# Patient Record
Sex: MALE
[De-identification: names, ages, dates, MRNs, and addresses within clinical notes are randomized per-mention and may not be internally consistent; named-entity substitution may affect disease eponyms.]

---

## 2019-11-05 ENCOUNTER — HOSPITAL ENCOUNTER (EMERGENCY)
Dept: HOSPITAL 97 - ER | Age: 49
LOS: 1 days | Discharge: HOME | End: 2019-11-06
Payer: SELF-PAY

## 2019-11-05 DIAGNOSIS — I10: Primary | ICD-10-CM

## 2019-11-05 LAB
ALBUMIN SERPL BCP-MCNC: 3.9 G/DL (ref 3.4–5)
ALP SERPL-CCNC: 61 U/L (ref 45–117)
ALT SERPL W P-5'-P-CCNC: 42 U/L (ref 12–78)
AST SERPL W P-5'-P-CCNC: 22 U/L (ref 15–37)
BUN BLD-MCNC: 13 MG/DL (ref 7–18)
GLUCOSE SERPLBLD-MCNC: 104 MG/DL (ref 74–106)
HCT VFR BLD CALC: 45.2 % (ref 39.6–49)
INR BLD: 1.1
LIPASE SERPL-CCNC: 68 U/L (ref 73–393)
LYMPHOCYTES # SPEC AUTO: 2.3 K/UL (ref 0.7–4.9)
MAGNESIUM SERPL-MCNC: 1.9 MG/DL (ref 1.8–2.4)
PMV BLD: 10.1 FL (ref 7.6–11.3)
POTASSIUM SERPL-SCNC: 3.7 MMOL/L (ref 3.5–5.1)
RBC # BLD: 5.99 M/UL (ref 4.33–5.43)
TROPONIN (EMERG DEPT USE ONLY): < 0.02 NG/ML (ref 0–0.04)

## 2019-11-05 PROCEDURE — 80076 HEPATIC FUNCTION PANEL: CPT

## 2019-11-05 PROCEDURE — 85025 COMPLETE CBC W/AUTO DIFF WBC: CPT

## 2019-11-05 PROCEDURE — 70450 CT HEAD/BRAIN W/O DYE: CPT

## 2019-11-05 PROCEDURE — 80048 BASIC METABOLIC PNL TOTAL CA: CPT

## 2019-11-05 PROCEDURE — 83735 ASSAY OF MAGNESIUM: CPT

## 2019-11-05 PROCEDURE — 71045 X-RAY EXAM CHEST 1 VIEW: CPT

## 2019-11-05 PROCEDURE — 96360 HYDRATION IV INFUSION INIT: CPT

## 2019-11-05 PROCEDURE — 93005 ELECTROCARDIOGRAM TRACING: CPT

## 2019-11-05 PROCEDURE — 83690 ASSAY OF LIPASE: CPT

## 2019-11-05 PROCEDURE — 84484 ASSAY OF TROPONIN QUANT: CPT

## 2019-11-05 PROCEDURE — 99285 EMERGENCY DEPT VISIT HI MDM: CPT

## 2019-11-05 PROCEDURE — 36415 COLL VENOUS BLD VENIPUNCTURE: CPT

## 2019-11-05 PROCEDURE — 85730 THROMBOPLASTIN TIME PARTIAL: CPT

## 2019-11-05 PROCEDURE — 85610 PROTHROMBIN TIME: CPT

## 2019-11-05 NOTE — EDPHYS
Physician Documentation                                                                           

 [facility]                                                                 

Name: Javad Gonzalez                                                                               

Age: 49 yrs                                                                                       

Sex: Male                                                                                         

: 1970                                                                                   

MRN: [id number]                                                                                   

Arrival Date: 2019                                                                          

Time: 20:39                                                                                       

Account#: [id number]                                                                            

Bed 8                                                                                             

Private MD:                                                                                       

ED Physician Lamont Camacho                                                                         

HPI:                                                                                              

                                                                                             

21:10 This 49 yrs old  Male presents to ER via Wheelchair with complaints of          rn  

      dizziness, chest pain.                                                                      

21:10 The patient presents with dizziness, generalized weakness, lightheadedness. Onset: The  rn  

      symptoms/episode began/occurred last night. Context: occurred at home, occurred while       

      the patient was standing. Modifying factors: The symptoms are alleviated by lying down,     

      the symptoms are aggravated by standing up, changing position. Associated signs and         

      symptoms: Pertinent positives: chest pain, Pertinent negatives: abdominal pain,             

      confusion, diaphoresis, headache, numbness, seizure, shortness of breath, syncope,          

      tingling, vomiting. Severity of symptoms: At their worst the symptoms were moderate in      

      the emergency department the symptoms have improved. The patient has not experienced        

      similar symptoms in the past. Reports since last night has experienced several episodes     

      of dizziness and feeling like is going to pass out, no LOC, no seizure, reports used to     

      get similar episodes in past with hypoglycemia and pancreas problems. Reports this          

      feels different, tried to drink something or eat something just in case and did not         

      help. Went to EMS station, told had high BP, and to take aspirin, felt better so went       

      home. REports happened again for 4th episode, this time felt some chest tightness,          

      brief, no w resolved. .                                                                     

                                                                                                  

Historical:                                                                                       

- Allergies:                                                                                      

20:42 No Known Allergies;                                                                     lp1 

- Home Meds:                                                                                      

20:42 None [Active];                                                                          lp1 

- PMHx:                                                                                           

20:42 None;                                                                                   lp1 

- PSHx:                                                                                           

20:42 Appendectomy;                                                                           lp1 

                                                                                                  

- Immunization history:: Adult Immunizations up to date.                                          

- Social history:: Smoking status: Patient/guardian denies using tobacco.                         

- Ebola Screening: : No symptoms or risks identified at this time.                                

- Family history:: not pertinent.                                                                 

- Hospitalizations: : No recent hospitalization is reported.                                      

                                                                                                  

                                                                                                  

ROS:                                                                                              

21:18 Constitutional: Negative for fever, chills, and weight loss, Eyes: Negative for injury, rn  

      pain, redness, and discharge, Neck: Negative for injury, pain, and swelling,                

      Cardiovascular: Negative for palpitations, and edema, Respiratory: Negative for             

      shortness of breath, cough, wheezing, and pleuritic chest pain, Abdomen/GI: Negative        

      for abdominal pain, nausea, vomiting, diarrhea, and constipation, MS/Extremity:             

      Negative for injury and deformity, Skin: Negative for injury, rash, and discoloration,      

      Neuro: Negative for headache, weakness, numbness, tingling, and seizure.                    

                                                                                                  

Exam:                                                                                             

21:18 Constitutional:  Overweight male, no acute distress. Head/Face:  Normocephalic,         rn  

      atraumatic. Eyes:  Pupils equal round and reactive to light, extra-ocular motions           

      intact.  Lids and lashes normal.  Conjunctiva and sclera are non-icteric and not            

      injected.  Cornea within normal limits.  Periorbital areas with no swelling, redness,       

      or edema. ENT:  dry MM Neck:  Trachea midline, no thyromegaly or masses palpated, and       

      no cervical lymphadenopathy.  Supple, full range of motion without nuchal rigidity, or      

      vertebral point tenderness.  No Meningismus. Cardiovascular:  Regular rate and rhythm,      

      no murmur.  No pulse deficits. Respiratory:  No increased work of breathing, no             

      retractions or nasal flaring. Abdomen/GI:  soft, non-tender MS/ Extremity:  Pulses          

      equal, no cyanosis.  Neurovascular intact.  Full, normal range of motion.  Equal            

      circumference. Neuro:  Awake and alert, GCS 15, oriented to person, place, time, and        

      situation.  Cranial nerves II-XII grossly intact.  Motor strength 5/5 in all                

      extremities.  Sensory grossly intact.  Cerebellar exam normal.                              

                                                                                                  

Vital Signs:                                                                                      

20:42  / 90; Pulse 87; Resp 20; Temp 97.5(O); Pulse Ox 97% on R/A; Weight 106.14 kg     lp1 

      (R); Pain 8/10;                                                                             

21:51  / 71 Supine; Pulse 77; Resp 20; Pulse Ox 95% on R/A;                             mw2 

21:55  / 82 Sitting; Pulse 84; Resp 19; Pulse Ox 96% on R/A;                            mw2 

22:00  / 81 Standing; Pulse 80; Resp 19; Pulse Ox 99% on R/A;                           mw2 

23:00  / 73; Pulse 80; Resp 15; Pulse Ox 97% on R/A;                                    jb4 

                                                                                             

00:00  / 77; Pulse 74; Resp 12; Pulse Ox 97% on R/A;                                    jb4 

                                                                                                  

MDM:                                                                                              

                                                                                             

20:44 Patient medically screened.                                                             rn  

23:50 Differential diagnosis: cardiac arrhythmia, hypovolemia, idiopathic dizziness,          rn  

      near-syncope, TIA, vertigo. Data reviewed: vital signs, nurses notes, lab test              

      result(s), EKG, radiologic studies, CT scan, plain films, and as a result, I will           

      discharge patient. Counseling: I had a detailed discussion with the patient and/or          

      guardian regarding: the historical points, exam findings, and any diagnostic results        

      supporting the discharge/admit diagnosis, the presence of at least one elevated blood       

      pressure reading (>120/80) during this emergency department visit, lab results,             

      radiology results, the need for outpatient follow up, to return to the emergency            

      department if symptoms worsen or persist or if there are any questions or concerns that     

      arise at home. Response to treatment: the patient's symptoms have resolved after            

      treatment, the patient's condition has returned to base line, the patient is now            

      symptom free, patient is well hydrated. and as a result, I will discharge patient.          

      Special discussion: I discussed with the patient/guardian in detail that at this point      

      there is no indication for admission to the hospital. It is understood, however, that       

      if the symptoms persist or worsen the patient needs to return immediately for               

      re-evaluation. Based on the history and exam findings, there is no indication for           

      further emergent testing or inpatient evaluation. I discussed with the patient/guardian     

      the need to see the cardiologist for further evaluation of the symptoms. I discussed        

      with the patient/guardian the need to see the primary care provider for further             

      evaluation of the symptoms. ED course: Trop neg x 2, no ischemia on ECG, CT head            

      negative for acute findings. Asymptomatic now and BP came down accordingly. Recommend       

      pcp f/u and cardiology f/u, along with daily BP measurement to see if needs medication      

      for BP. Currently down to 101/73 and asymptomatic so will not prescribe HTN med given       

      risk of hypotension and exacerbating near syncopal episodes that patient felt today. .      

                                                                                                  

                                                                                             

20:53 Order name: Basic Metabolic Panel; Complete Time: 22:17                                 rn  

                                                                                             

20:53 Order name: CBC with Diff; Complete Time: 22:17                                         rn  

                                                                                             

20:53 Order name: Hepatic Function; Complete Time: 22:17                                      rn  

                                                                                             

20:53 Order name: Lipase; Complete Time: 22:17                                                rn  

                                                                                             

20:53 Order name: Magnesium; Complete Time: 22:17                                             rn  

                                                                                             

20:53 Order name: Protime (+inr); Complete Time: 22:17                                        rn  

                                                                                             

20:53 Order name: CT Head Brain wo Cont                                                       rn  

                                                                                             

20:53 Order name: Ptt, Activated; Complete Time: 22:17                                        rn  

                                                                                             

20:53 Order name: Troponin (emerg Dept Use Only); Complete Time: 22:17                        rn  

                                                                                             

20:53 Order name: EKG; Complete Time: 20:54                                                   rn  

                                                                                             

20:53 Order name: Cardiac monitoring; Complete Time: 20:58                                    rn  

                                                                                             

20:53 Order name: XRAY Chest (1 view)                                                         rn  

                                                                                             

22:48 Order name: Troponin (emerg Dept Use Only); Complete Time: 23:48                        rn  

                                                                                             

20:53 Order name: EKG - Nurse/Tech; Complete Time: 20:58                                      rn  

                                                                                             

20:53 Order name: IV Saline Lock; Complete Time: 20:58                                        rn  

                                                                                             

20:53 Order name: Labs collected and sent; Complete Time: 20:58                               rn  

                                                                                             

20:53 Order name: NPO; Complete Time: 20:58                                                   rn  

                                                                                             

20:53 Order name: O2 Per Protocol; Complete Time: 20:58                                       rn  

                                                                                             

20:53 Order name: O2 Sat Monitoring; Complete Time: 20:58                                     rn  

                                                                                             

20:54 Order name: Orthostatic Blood Pressure; Complete Time: 21:20                            rn  

                                                                                                  

Administered Medications:                                                                         

20:59 Drug: NS 0.9% 1000 ml Route: IV; Rate: 1000 ml; Site: right antecubital;                aa1 

22:00 Follow up: Response: No adverse reaction; IV Status: Completed infusion; IV Intake:     jb4 

      1000ml                                                                                      

                                                                                                  

                                                                                                  

Disposition:                                                                                      

19 23:52 Discharged to Home. Impression: Hypertension, Dizziness and giddiness.             

- Condition is Stable.                                                                            

- Discharge Instructions: Dizziness, Hypertension, Managing Your Hypertension.                    

                                                                                                  

- Medication Reconciliation Form, Thank You Letter, Antibiotic Education, Prescription            

  Opioid Use form.                                                                                

- Follow up: Private Physician; When: As needed; Reason: Recheck today's complaints,              

  Re-evaluation by your physician.                                                                

- Problem is new.                                                                                 

- Symptoms have improved.                                                                         

                                                                                                  

                                                                                                  

                                                                                                  

Signatures:                                                                                       

Dispatcher [employer]                           EDMS                                                 

Kandis Thompson, RN                  RN   aa1                                                  

Lamont Camacho MD MD rn Pena, Laura, RN                         RN   lp1                                                  

Chris Dumont RN                       RN   jb4                                                  

                                                                                                  

Corrections: (The following items were deleted from the chart)                                    

21:13 21:10 Reports since last night has experienced several episodes of dizziness and        rn  

      feeling like is going to pass out, no LOC, no seizure, reports used to get similar          

      episodes in past with hypoglycemia and pancreas problems. . rn                              

                                                                                             

00:12  23:52 2019 23:52 Discharged to Home. Impression: Hypertension; Dizziness    jb4 

      and giddiness. Condition is Stable. Forms are Medication Reconciliation Form, Thank You     

      Letter, Antibiotic Education, Prescription Opioid Use. Follow up: Private Physician;        

      When: As needed; Reason: Recheck today's complaints, Re-evaluation by your physician.       

      Problem is new. Symptoms have improved. rn                                                  

                                                                                                  

**************************************************************************************************

## 2019-11-05 NOTE — ER
Nurse's Notes                                                                                     

 [facility]                                                                 

Name: Javad Gonzalez                                                                               

Age: 49 yrs                                                                                       

Sex: Male                                                                                         

: 1970                                                                                   

MRN: [id number]                                                                                   

Arrival Date: 2019                                                                          

Time: 20:39                                                                                       

Account#: [id number]                                                                            

Bed 8                                                                                             

Private MD:                                                                                       

Diagnosis: Hypertension;Dizziness and giddiness                                                   

                                                                                                  

Presentation:                                                                                     

                                                                                             

20:41 Presenting complaint: Patient states: High blood pressure since last night, shortness   lp1 

      of breath, chest pain, worse tonight. Transition of care: patient was not received from     

      another setting of care. Onset of symptoms was 2019. Risk Assessment: Do       

      you want to hurt yourself or someone else? Patient reports no desire to harm self or        

      others. Initial Sepsis Screen: Does the patient meet any 2 criteria? No. Patient's          

      initial sepsis screen is negative. Does the patient have a suspected source of              

      infection? No. Patient's initial sepsis screen is negative. Care prior to arrival: None.    

20:41 Method Of Arrival: Wheelchair                                                           lp1 

20:41 Acuity: BABAR 3                                                                           lp1 

                                                                                                  

Historical:                                                                                       

- Allergies:                                                                                      

20:42 No Known Allergies;                                                                     lp1 

- Home Meds:                                                                                      

20:42 None [Active];                                                                          lp1 

- PMHx:                                                                                           

20:42 None;                                                                                   lp1 

- PSHx:                                                                                           

20:42 Appendectomy;                                                                           lp1 

                                                                                                  

- Immunization history:: Adult Immunizations up to date.                                          

- Social history:: Smoking status: Patient/guardian denies using tobacco.                         

- Ebola Screening: : No symptoms or risks identified at this time.                                

- Family history:: not pertinent.                                                                 

- Hospitalizations: : No recent hospitalization is reported.                                      

                                                                                                  

                                                                                                  

Screenin:50 Abuse screen: Denies threats or abuse. Nutritional screening: No deficits noted.        jb4 

      Tuberculosis screening: No symptoms or risk factors identified. Fall Risk None              

      identified.                                                                                 

                                                                                                  

Assessment:                                                                                       

20:50 General: Appears in no apparent distress. uncomfortable, Behavior is calm, cooperative, jb4 

      appropriate for age. Pain: Complains of pain in anterior aspect of left upper chest         

      Pain does not radiate. Pain currently is 4 out of 10 on a pain scale. Quality of pain       

      is described as pressure. Neuro: Level of Consciousness is awake, alert, obeys              

      commands, Oriented to person, place, time, situation. Cardiovascular: Patient's skin is     

      warm and dry. Rhythm is sinus rhythm. Respiratory: Airway is patent Respiratory effort      

      is even, unlabored, Respiratory pattern is regular, symmetrical. GI: No deficits noted.     

      No signs and/or symptoms were reported involving the gastrointestinal system. : No        

      deficits noted. No signs and/or symptoms were reported regarding the genitourinary          

      system. EENT: No deficits noted. No signs and/or symptoms were reported regarding the       

      EENT system. Derm: Skin is intact, Skin is pink, warm \T\ dry. Musculoskeletal:             

      Circulation, motion, and sensation intact. Range of motion: intact in all extremities.      

22:03 Reassessment: Patient appears in no apparent distress at this time. Patient and/or      jb4 

      family updated on plan of care and expected duration. Pain level reassessed. Patient is     

      alert, oriented x 3, equal unlabored respirations, skin warm/dry/pink.                      

23:00 Reassessment: Patient appears in no apparent distress at this time. Patient and/or      jb4 

      family updated on plan of care and expected duration. Pain level reassessed. Patient is     

      alert, oriented x 3, equal unlabored respirations, skin warm/dry/pink. Family is at the     

      bedside.                                                                                    

                                                                                             

00:09 Reassessment: Patient appears in no apparent distress at this time. Patient and/or      jb4 

      family updated on plan of care and expected duration. Pain level reassessed. Patient is     

      alert, oriented x 3, equal unlabored respirations, skin warm/dry/pink.                      

                                                                                                  

Vital Signs:                                                                                      

                                                                                             

20:42  / 90; Pulse 87; Resp 20; Temp 97.5(O); Pulse Ox 97% on R/A; Weight 106.14 kg     lp1 

      (R); Pain 8/10;                                                                             

21:51  / 71 Supine; Pulse 77; Resp 20; Pulse Ox 95% on R/A;                             mw2 

21:55  / 82 Sitting; Pulse 84; Resp 19; Pulse Ox 96% on R/A;                            mw2 

22:00  / 81 Standing; Pulse 80; Resp 19; Pulse Ox 99% on R/A;                           mw2 

23:00  / 73; Pulse 80; Resp 15; Pulse Ox 97% on R/A;                                    jb4 

                                                                                             

00:00  / 77; Pulse 74; Resp 12; Pulse Ox 97% on R/A;                                    jb4 

                                                                                                  

ED Course:                                                                                        

                                                                                             

20:39 Patient arrived in ED.                                                                  jb4 

20:42 Triage completed.                                                                       lp1 

20:42 Arm band placed on.                                                                     lp1 

20:42 Patient has correct armband on for positive identification. Bed in low position.        lp1 

      Cardiac monitor on. Pulse ox on. NIBP on.                                                   

20:44 Lamont Camacho MD is Attending Physician.                                                rn  

20:50 Inserted saline lock: 20 gauge in right antecubital area, using aseptic technique.      jb4 

      Blood collected.                                                                            

21:00 Chris Dumont RN is Primary Nurse.                                                     jb4 

21:16 CT completed. Patient tolerated procedure well. Patient moved back from CT.             nj  

21:25 CT Head Brain wo Cont In Process Unspecified.                                           EDMS

21:32 XRAY Chest (1 view) In Process Unspecified.                                             EDMS

22:03 Warm blanket given.                                                                     jb4 

                                                                                             

00:00 No provider procedures requiring assistance completed. IV discontinued, intact,         jb4 

      bleeding controlled, No redness/swelling at site. Pressure dressing applied.                

                                                                                                  

Administered Medications:                                                                         

                                                                                             

20:59 Drug: NS 0.9% 1000 ml Route: IV; Rate: 1000 ml; Site: right antecubital;                aa1 

22:00 Follow up: Response: No adverse reaction; IV Status: Completed infusion; IV Intake:     jb4 

      1000ml                                                                                      

                                                                                                  

                                                                                                  

Intake:                                                                                           

22:00 IV: 1000ml; Total: 1000ml.                                                              jb4 

                                                                                                  

Outcome:                                                                                          

23:52 Discharge ordered by MD.                                                                rn  

                                                                                             

00:10 Discharged to home ambulatory, with family.                                             jb4 

      Condition: stable                                                                           

      Discharge instructions given to patient, family, Instructed on discharge instructions,      

      follow up and referral plans. Demonstrated understanding of instructions, follow-up         

      care.                                                                                       

00:12 Patient left the ED.                                                                    jb4 

                                                                                                  

Signatures:                                                                                       

Dispatcher [employer]                           EDMS                                                 

Kandis Thompson RN                  RN   aa1                                                  

Lamont Camacho MD MD rn Pena, Laura, RN RN   lp1                                                  

Chris Dumont RN                       RN   jb4                                                  

[place] [facility]                            [facility]                                                  

                                                                                                  

**************************************************************************************************

## 2019-11-06 VITALS — TEMPERATURE: 97.5 F

## 2019-11-06 VITALS — OXYGEN SATURATION: 97 % | SYSTOLIC BLOOD PRESSURE: 101 MMHG | DIASTOLIC BLOOD PRESSURE: 73 MMHG

## 2019-11-06 NOTE — EKG
Test Date:    2019-11-05               Test Time:    20:37:07

Technician:   MICKEY                                     

                                                     

MEASUREMENT RESULTS:                                       

Intervals:                                           

Rate:         92                                     

ME:           152                                    

QRSD:         92                                     

QT:           346                                    

QTc:          427                                    

Axis:                                                

P:            73                                     

ME:           152                                    

QRS:          29                                     

T:            65                                     

                                                     

INTERPRETIVE STATEMENTS:                                       

                                                     

Normal sinus rhythm

Normal ECG

No previous ECG available for comparison



Electronically Signed On 11-06-19 12:48:56 CST by Stevie Mercedes

## 2019-11-06 NOTE — RAD REPORT
EXAM DESCRIPTION:  RAD - Chest Single View - 11/5/2019 9:32 pm

 

CLINICAL HISTORY:  Chest pain, hypertension

 

COMPARISON:  None.

 

TECHNIQUE:  AP portable chest image was obtained 2124 hours .

 

FINDINGS:  Lungs are clear. Heart size is upper normal, accentuated by portable technique and large b
suzy habitus. No vascular engorgement. No measurable pleural effusion and no pneumothorax. No acute kristian
ny abnormality seen. No acute aortic findings suspected.

 

IMPRESSION:  No acute cardiopulmonary process.

## 2019-11-06 NOTE — RAD REPORT
EXAM DESCRIPTION:  CT - Head Brain Wo Cont - 11/6/2019 2:35 am

 

CLINICAL HISTORY:  Dizziness, near syncope

 

COMPARISON:  None Available.

 

TECHNIQUE:  Multiple helical axial tomographic images were obtained of the head without intravenous c
ontrast. This exam was performed according to our departmental dose-optimization program, which inclu
romario automated exposure control, adjustment of the mA and/or kV according to patient size and/or use o
f iterative reconstruction technique.

 

FINDINGS:  There is no acute intracranial hemorrhage. No mass. No midline shift. No ventriculomegaly.
 Gray-white matter differentiation is maintained.

Paranasal sinuses are clear. Mastoid air cells and middle ear spaces are clear. Orbits and orbital co
ntents are unremarkable.

Osseous structures are unremarkable. Surrounding soft tissues are unremarkable.

 

IMPRESSION:  No acute intracranial process.

 

Electronically signed by:   Tucker Lawson MD   11/5/2019 9:54 PM CST Workstation: [id number]

 

 

 

Due to temporary technical issues with the PACS/Fluency reporting system, reports are being signed by
 the in house radiologist as a courtesy to ensure prompt reporting. The interpreting radiologist is f
ully responsible for the content of the report.